# Patient Record
Sex: MALE | Race: WHITE | NOT HISPANIC OR LATINO | Employment: OTHER | URBAN - METROPOLITAN AREA
[De-identification: names, ages, dates, MRNs, and addresses within clinical notes are randomized per-mention and may not be internally consistent; named-entity substitution may affect disease eponyms.]

---

## 2021-04-08 DIAGNOSIS — Z23 ENCOUNTER FOR IMMUNIZATION: ICD-10-CM

## 2024-01-22 ENCOUNTER — APPOINTMENT (OUTPATIENT)
Dept: RADIOLOGY | Facility: CLINIC | Age: 64
End: 2024-01-22
Payer: COMMERCIAL

## 2024-01-22 DIAGNOSIS — R05.9 COUGH, UNSPECIFIED TYPE: ICD-10-CM

## 2024-01-22 DIAGNOSIS — R50.9 FEVER, UNSPECIFIED FEVER CAUSE: ICD-10-CM

## 2024-01-22 DIAGNOSIS — R07.9 CHEST PAIN, UNSPECIFIED TYPE: ICD-10-CM

## 2024-01-22 PROCEDURE — 71046 X-RAY EXAM CHEST 2 VIEWS: CPT

## 2025-01-01 ENCOUNTER — HOSPICE ADMISSION (OUTPATIENT)
Dept: HOME HOSPICE | Facility: HOSPICE | Age: 65
End: 2025-01-01
Payer: MEDICARE

## 2025-01-01 PROCEDURE — 10330057 MEDICATION, GENERAL

## 2025-01-01 PROCEDURE — 10330064 BRIEF, TAB CLSR ULTRA MED 32-44 (16/BG 6

## 2025-01-01 PROCEDURE — 10330064 GLOVE, EXAM VNYL MED N/S (100/BX 10BX/CS

## 2025-01-01 PROCEDURE — 10330064 UNDERPAD, REUSE CTN FACE IND PK 34X36"

## 2025-01-01 PROCEDURE — 10330087 HSPC SERVICE INTENSITY ADD-ON

## 2025-01-01 PROCEDURE — 10330064 DRAINING KIT, BOTTLE ASEPT 1000ML (10/CS

## 2025-01-08 ENCOUNTER — TELEPHONE (OUTPATIENT)
Age: 65
End: 2025-01-08

## 2025-01-08 NOTE — TELEPHONE ENCOUNTER
Incoming call by Shayan looking to schedule a consult with goal of obtaining a thoracentesis as soon as possible.   Routing to Dr. Higuera to confirm that this may be able to be done at [facility].

## 2025-01-09 NOTE — TELEPHONE ENCOUNTER
Thank you for the message.  I called the patient and check in with him to see if this needs to be done urgent.  He is okay with waiting until the appointment on the 16th.

## 2025-01-13 ENCOUNTER — OFFICE VISIT (OUTPATIENT)
Dept: PULMONOLOGY | Facility: CLINIC | Age: 65
End: 2025-01-13
Payer: COMMERCIAL

## 2025-01-13 ENCOUNTER — TELEPHONE (OUTPATIENT)
Age: 65
End: 2025-01-13

## 2025-01-13 VITALS
SYSTOLIC BLOOD PRESSURE: 120 MMHG | TEMPERATURE: 98 F | WEIGHT: 185 LBS | HEART RATE: 120 BPM | DIASTOLIC BLOOD PRESSURE: 70 MMHG | OXYGEN SATURATION: 95 %

## 2025-01-13 DIAGNOSIS — J90 BILATERAL PLEURAL EFFUSION: Primary | ICD-10-CM

## 2025-01-13 PROCEDURE — G2211 COMPLEX E/M VISIT ADD ON: HCPCS | Performed by: STUDENT IN AN ORGANIZED HEALTH CARE EDUCATION/TRAINING PROGRAM

## 2025-01-13 PROCEDURE — 99204 OFFICE O/P NEW MOD 45 MIN: CPT | Performed by: STUDENT IN AN ORGANIZED HEALTH CARE EDUCATION/TRAINING PROGRAM

## 2025-01-13 RX ORDER — FINASTERIDE 1 MG/1
1 TABLET, FILM COATED ORAL 3 TIMES WEEKLY
COMMUNITY

## 2025-01-13 RX ORDER — OXYCODONE HYDROCHLORIDE 5 MG/1
5 TABLET ORAL
COMMUNITY
Start: 2024-12-05

## 2025-01-13 RX ORDER — DEXTROMETHORPHAN HYDROBROMIDE AND PROMETHAZINE HYDROCHLORIDE 15; 6.25 MG/5ML; MG/5ML
SYRUP ORAL
COMMUNITY

## 2025-01-13 RX ORDER — SILDENAFIL 100 MG/1
1 TABLET, FILM COATED ORAL DAILY PRN
COMMUNITY

## 2025-01-13 RX ORDER — METOPROLOL SUCCINATE 50 MG/1
50 TABLET, EXTENDED RELEASE ORAL EVERY MORNING
COMMUNITY

## 2025-01-13 RX ORDER — OLANZAPINE 5 MG/1
5 TABLET ORAL EVERY MORNING
COMMUNITY

## 2025-01-13 RX ORDER — LEVOTHYROXINE SODIUM 100 UG/1
1 TABLET ORAL
COMMUNITY
Start: 2024-09-26

## 2025-01-13 RX ORDER — ONDANSETRON 4 MG/1
TABLET, ORALLY DISINTEGRATING ORAL
COMMUNITY
Start: 2024-12-05

## 2025-01-13 RX ORDER — ROSUVASTATIN CALCIUM 20 MG/1
1 TABLET, COATED ORAL
COMMUNITY

## 2025-01-13 RX ORDER — DEXTROMETHORPHAN HBR. AND GUAIFENESIN 10; 100 MG/5ML; MG/5ML
5 SOLUTION ORAL
COMMUNITY
Start: 2025-01-09

## 2025-01-13 RX ORDER — ROSUVASTATIN CALCIUM 20 MG/1
20 TABLET, COATED ORAL DAILY
COMMUNITY
Start: 2024-10-31

## 2025-01-13 RX ORDER — AMLODIPINE BESYLATE 5 MG/1
5 TABLET ORAL DAILY
COMMUNITY
Start: 2024-10-31

## 2025-01-13 RX ORDER — VALACYCLOVIR HYDROCHLORIDE 1 G/1
1 TABLET, FILM COATED ORAL 3 TIMES DAILY
COMMUNITY

## 2025-01-13 NOTE — PROGRESS NOTES
Thoracentesis Procedure Note    Pre-operative Diagnosis: Left malignant pleural effusion    Post-operative Diagnosis: same    Indications: Left pleural effusion    Procedure Details     Consent: Informed consent was obtained. Risks of the procedure were discussed including: infection, bleeding, pain, pneumothorax.    Under sterile conditions the patient was positioned. Betadine solution and sterile drapes were utilized.  1% buffered lidocaine was used to anesthetize the left seventh rib space. Fluid was obtained without any difficulties and minimal blood loss.  A dressing was applied to the wound and wound care instructions were provided.  This required 3 attempts to enter the pleural space    Findings  850 ml of yellow clear pleural fluid was obtained. A sample was sent to Pathology for cytogenetics, flow, and cell counts, as well as for infection analysis.    Complications:  None; patient tolerated the procedure well.          Condition: stable    Plan  Follow-up in 1 month  At that time we can decide if indwelling pleural cath is indicated  Tylenol 650 mg. for pain.    Attending Attestation: I was present for the entire procedure.    Anders Higuera MD  Pulmonary and Critical Care Medicine

## 2025-01-13 NOTE — PROGRESS NOTES
Pulmonary Outpatient Consultation Note   Shayan Prabhakar 64 y.o. male MRN: [id number]  1/13/2025    Referring provider: No referring provider defined for this encounter.     Reason for Consultation: Pleural effusion management    Assessment:    Bilateral pleural effusion likely in setting of stage IV tonsillar cancer along with prostate cancer.  Had a left thoracentesis on December 2 with negative cytology but now complaining of worsening shortness of breath.  Bilateral pleural effusions noted on ultrasound today.  Patient agreeable to bilateral thoracentesis while on Eliquis  Small pericardial effusion, no tamponade physiology in office.  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  He will think about this and follow-up with me in 1 month  Patient considering hospice as he has been through extensive treatment and feels like disease is progressing.  Follow-up in 1 month with me  I have spent a total time of 42 minutes in caring for this patient on the day of the visit/encounter including Diagnostic results, Prognosis, Risks and benefits of tx options, Instructions for management, Patient and family education, Importance of tx compliance, Risk factor reductions, Impressions, Counseling / Coordination of care, Documenting in the medical record, Reviewing / ordering tests, medicine, procedures  , and Obtaining or reviewing history   .    History of Present Illness   HPI:      64-year-old gentleman referred to pulmonary due to pleural effusion management.  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  Along with a history of prostate cancer in 2016, tonsillectomy, left radical neck dissection along with chemoradiation, pulmonary embolism on Eliquis.     Regarding his left tonsil squamous cell this was diagnosed in 2017 as invasive squamous cell carcinoma completed chemotherapy and radiation, in 2012 was found to have enlarging pulmonary nodules suspicious for metastasis underwent VATS left upper lobe nodule wedge resection which was poorly differentiated squamous cell carcinoma involving the lung and the visceral pleura this was known as metastasis from the head and neck, treated with SBRT in 2013    Recently had a left thoracentesis that [facility], cytology was negative.    Patient lives in the [facility] and usually goes to [facility] for treatment but now is leaning towards hospice.  He did have a thoracentesis on December 2 and had 850 mL removed from the left lung and felt mildly improved at the time.  He was initially scheduled to see me later in the week but due to worsening shortness of breath over the weekend he decided to come in earlier.  He is afraid that he is having enlarging pleural effusions and is requesting bilateral thoracentesis.    In the office we performed bilateral pleural ultrasound and found adequate fluid on both sides along with and he was agreeable to proceed with bilateral thoracentesis.  He also requested I do a POCUS of the heart as he was told that he may have a small pericardial effusion, I see very scant amount of fluid in the  upright position around the right ventricle in the parasternal long axis.  No fluid in the four-chamber or subcostal view.    Today we performed bilateral thoracentesis, right side with 1000 mL removed, left side with 850 mL removed.  This was not tested as this is presumed to be malignant.  I did discuss with him if he wants a indwelling pleural catheter this is reasonable.  As a last time he was drained was about 6 weeks ago I do not think we need to rush into placing a catheter.  But this is definitely an option going forward      Review of Systems   Constitutional:  Positive for activity change, appetite change and fatigue.   HENT: Negative.     Eyes: Negative.    Respiratory:  Positive for shortness of breath.    Cardiovascular: Negative.    Gastrointestinal: Negative.    Endocrine: Negative.    Genitourinary: Negative.    Musculoskeletal: Negative.    Skin: Negative.    Allergic/Immunologic: Positive for immunocompromised state.   Neurological: Negative.    Hematological:  Bruises/bleeds easily.   Psychiatric/Behavioral: Negative.           Historical Information   History reviewed. No pertinent past medical history.  Tonsillar cancer   History reviewed. No pertinent surgical history.  VATS  History reviewed. No pertinent family history.  Denies    Occupational History: NA    Social History     Tobacco Use   Smoking Status Never   Smokeless Tobacco Not on file       Meds/Allergies     Current Outpatient Medications:   •  amLODIPine (NORVASC) 5 mg tablet, Take 5 mg by mouth daily, Disp: , Rfl:   •  apixaban (ELIQUIS) 5 mg, Take 5 mg by mouth 2 (two) times a day, Disp: , Rfl:   •  dextromethorphan-guaiFENesin (ROBITUSSIN-DM)  mg/5 mL oral liquid, Take 5 mL by mouth, Disp: , Rfl:   •  levothyroxine 100 mcg tablet, Take 1 tablet by mouth daily before breakfast, Disp: , Rfl:   •  ondansetron (ZOFRAN-ODT) 4 mg disintegrating tablet, dissolve 1 tablet ON TONGUE every 8 hours if needed for nausea OR vomiting,  "Disp: , Rfl:   •  oxyCODONE (ROXICODONE) 5 immediate release tablet, Take 5 mg by mouth, Disp: , Rfl:   •  rosuvastatin (CRESTOR) 20 MG tablet, Take 20 mg by mouth daily, Disp: , Rfl:   •  finasteride (Propecia) 1 MG tablet, Take 1 mg by mouth 3 (three) times a week, Disp: , Rfl:   •  metoprolol succinate (TOPROL-XL) 50 mg 24 hr tablet, Take 50 mg by mouth every morning, Disp: , Rfl:   •  OLANZapine (ZyPREXA) 5 mg tablet, Take 5 mg by mouth every morning, Disp: , Rfl:   •  omeprazole (PriLOSEC) 20 mg delayed release capsule, Take 40 mg by mouth daily, Disp: , Rfl:   •  promethazine-dextromethorphan (PHENERGAN-DM) 6.25-15 mg/5 mL oral syrup, take 5 MILLILITERS by mouth four times a day if needed for cough, Disp: , Rfl:   •  rosuvastatin (CRESTOR) 20 MG tablet, Take 1 tablet by mouth daily at bedtime, Disp: , Rfl:   •  sildenafil (VIAGRA) 100 mg tablet, Take 1 tablet by mouth daily as needed, Disp: , Rfl:   •  valACYclovir (VALTREX) 1,000 mg tablet, Take 1 g by mouth Three times a day, Disp: , Rfl:   No Known Allergies    Vitals: Blood pressure 120/70, pulse (!) 120, temperature 98 °F (36.7 °C), temperature source Tympanic, weight 83.9 kg (185 lb), SpO2 95%., There is no height or weight on file to calculate BMI. Oxygen Therapy  SpO2: 95 %    Physical Exam:    GEN: alert and oriented x 3, pleasant and cooperative   HEENT:  Normocephalic, atraumatic  NECK: No JVD   HEART: Rate, normal S1 and S2  LUNGS: Diminished bilaterally  ABDOMEN:  Normoactive bowel sounds, soft, no tenderness, no distention  EXTREMITIES: no edema  NEURO: no gross focal findings  SKIN:  Dry, intact, warm to touch    Labs: I have personally reviewed pertinent lab results.  Left cytology negative  No results found for: \"IGE\"    Imaging and other studies: Results Review Statement: No pertinent imaging studies reviewed.    Pulmonary function testing:  NA    Other Studies: Results Review Statement: No pertinent imaging studies reviewed.    Anders Higuera" MD  Pulmonary and Critical Care Medicine

## 2025-01-13 NOTE — TELEPHONE ENCOUNTER
Patient called and asked for sooner appointment with Dr Higuera. Patient worried since Dr Higuera advised a follow up in one month and they are worried they will need another thoracentesis procedure sooner than appointment given 3/4/25 at 9:20 am with Dr Higuera. Patient requested a call back to discuss. Please advise.

## 2025-01-13 NOTE — PATIENT INSTRUCTIONS
It was a pleasure seeing you today!    Follow-up in 1 month with me  Consider bilateral indwelling pleural catheters or as needed thoracentesis    Anders Higuera MD  Pulmonary and Critical Care Medicine

## 2025-01-13 NOTE — PROGRESS NOTES
Thoracentesis Procedure Note    Pre-operative Diagnosis: Right Malignant effusion     Post-operative Diagnosis: same    Indications: Right pleural effusion     Procedure Details     Consent: Informed consent was obtained. Risks of the procedure were discussed including: infection, bleeding, pain, pneumothorax.    Under sterile conditions the patient was positioned. Betadine solution and sterile drapes were utilized.  1% buffered lidocaine was used to anesthetize the right 7th rib space. Fluid was obtained without any difficulties and minimal blood loss.  A dressing was applied to the wound and wound care instructions were provided.     Findings  1000 ml of bloody pleural fluid was obtained. A sample was sent to Pathology for cytogenetics, flow, and cell counts, as well as for infection analysis.    Complications:  None; patient tolerated the procedure well.          Condition: stable    Plan  No CXR  Has pleural sliding  Tylenol 650 mg. for pain.  Follow up in 1 month for evaluation of indwelling pleural catheter     Attending Attestation: I was present for the entire procedure.    Anders Higuera MD  Pulmonary and Critical Care Medicine

## 2025-01-14 ENCOUNTER — NURSE TRIAGE (OUTPATIENT)
Age: 65
End: 2025-01-14

## 2025-01-14 NOTE — TELEPHONE ENCOUNTER
"Incoming call:    Shayan experiencing 102.2 fever today after a bilateral thoracentesis performed yesterday. Unsure if related. Denies any other symptoms apart from fatigue.   Denies excess/purulent drainage or discoloration of dressings.    Routing for provider review and recommendation. No appointments with Dr. Higuera open for today per protocol.     Reason for Disposition   Fever > 101 F (38.3 C) and over 60 years of age    Answer Assessment - Initial Assessment Questions  1. TEMPERATURE: \"What is the most recent temperature?\"  \"How was it measured?\"       102.2 F  2. ONSET: \"When did the fever start?\"       Today  3. CHILLS: \"Do you have chills?\" If yes: \"How bad are they?\"  (e.g., none, mild, moderate, severe)      -  4. OTHER SYMPTOMS: \"Do you have any other symptoms besides the fever?\"  (e.g., abdomen pain, cough, diarrhea, earache, headache, sore throat, urination pain)      fatigue  5. CAUSE: If there are no symptoms, ask: \"What do you think is causing the fever?\"       unsure    Protocols used: Fever-Adult-[place]    "

## 2025-01-15 NOTE — TELEPHONE ENCOUNTER
I called patient, still feels unwell, fever 101, taking tylenol. Some discomfort by his sides which may be coughing related. No muscle aches or sore throat. Would be odd for this to occur from thoracentesis. Could have picked up a viral infection in general.    Advised to try tylenol for another 24-48 hours and if no relief he should come to ER for swabs and imaging.     SK

## 2025-01-27 ENCOUNTER — NURSE TRIAGE (OUTPATIENT)
Age: 65
End: 2025-01-27

## 2025-01-27 NOTE — TELEPHONE ENCOUNTER
"Pt stated Pulm Provider: Dr. Higuera    Actionable item: Advise on SOB/Effusion/CXR    What is the reason for the call/chief complaint?  Pt states he feels like fluid is building up in lungs again, worsening SOB with activity. Denies fever or other S/S.    Priority: HIGH      Reason for Disposition   Nursing judgment    Answer Assessment - Initial Assessment Questions  1. REASON FOR CALL: \"What is the main reason for your call?\" or \"How can I best help you?\"      Pt states he feels like fluid is building up again in lungs, worsening SOB.   2. SYMPTOMS : \"Do you have any symptoms?\"       Worsening SOB with activity like previous time.  3. OTHER QUESTIONS: \"Do you have any other questions?\"      No    Protocols used: Information Only Call - No Triage-Adult-[place]    "

## 2025-01-28 ENCOUNTER — PREP FOR PROCEDURE (OUTPATIENT)
Dept: PULMONOLOGY | Facility: CLINIC | Age: 65
End: 2025-01-28

## 2025-01-28 ENCOUNTER — PROCEDURE VISIT (OUTPATIENT)
Dept: PULMONOLOGY | Facility: CLINIC | Age: 65
End: 2025-01-28
Payer: MEDICARE

## 2025-01-28 ENCOUNTER — RESULTS FOLLOW-UP (OUTPATIENT)
Dept: PULMONOLOGY | Facility: CLINIC | Age: 65
End: 2025-01-28

## 2025-01-28 ENCOUNTER — HOSPITAL ENCOUNTER (OUTPATIENT)
Dept: CT IMAGING | Facility: HOSPITAL | Age: 65
Discharge: HOME | End: 2025-01-28
Attending: STUDENT IN AN ORGANIZED HEALTH CARE EDUCATION/TRAINING PROGRAM
Payer: MEDICARE

## 2025-01-28 VITALS
OXYGEN SATURATION: 99 % | SYSTOLIC BLOOD PRESSURE: 122 MMHG | DIASTOLIC BLOOD PRESSURE: 70 MMHG | TEMPERATURE: 97.8 F | HEART RATE: 98 BPM

## 2025-01-28 DIAGNOSIS — J90 BILATERAL PLEURAL EFFUSION: Primary | ICD-10-CM

## 2025-01-28 DIAGNOSIS — J90 BILATERAL PLEURAL EFFUSION: ICD-10-CM

## 2025-01-28 LAB
APPEARANCE FLD: CLEAR
COLOR FLD: ABNORMAL
LDH FLD L TO P-CCNC: 828 U/L
LYMPHOCYTES NFR BLD AUTO: 48 %
MONOCYTES NFR BLD AUTO: 49 %
NEUTS SEG NFR BLD AUTO: 3 %
PROT FLD-MCNC: 3.4 G/DL
SITE: ABNORMAL
TOTAL CELLS COUNTED SPEC: 100
WBC # FLD MANUAL: 676 /UL

## 2025-01-28 PROCEDURE — 88185 FLOWCYTOMETRY/TC ADD-ON: CPT | Performed by: STUDENT IN AN ORGANIZED HEALTH CARE EDUCATION/TRAINING PROGRAM

## 2025-01-28 PROCEDURE — 71250 CT THORAX DX C-: CPT

## 2025-01-28 PROCEDURE — 99214 OFFICE O/P EST MOD 30 MIN: CPT | Performed by: STUDENT IN AN ORGANIZED HEALTH CARE EDUCATION/TRAINING PROGRAM

## 2025-01-28 PROCEDURE — 87070 CULTURE OTHR SPECIMN AEROBIC: CPT | Performed by: STUDENT IN AN ORGANIZED HEALTH CARE EDUCATION/TRAINING PROGRAM

## 2025-01-28 PROCEDURE — 32555 ASPIRATE PLEURA W/ IMAGING: CPT | Performed by: STUDENT IN AN ORGANIZED HEALTH CARE EDUCATION/TRAINING PROGRAM

## 2025-01-28 PROCEDURE — 88112 CYTOPATH CELL ENHANCE TECH: CPT | Performed by: STUDENT IN AN ORGANIZED HEALTH CARE EDUCATION/TRAINING PROGRAM

## 2025-01-28 PROCEDURE — 87205 SMEAR GRAM STAIN: CPT | Performed by: STUDENT IN AN ORGANIZED HEALTH CARE EDUCATION/TRAINING PROGRAM

## 2025-01-28 PROCEDURE — 88305 TISSUE EXAM BY PATHOLOGIST: CPT | Performed by: STUDENT IN AN ORGANIZED HEALTH CARE EDUCATION/TRAINING PROGRAM

## 2025-01-28 PROCEDURE — 89051 BODY FLUID CELL COUNT: CPT | Performed by: STUDENT IN AN ORGANIZED HEALTH CARE EDUCATION/TRAINING PROGRAM

## 2025-01-28 PROCEDURE — 84157 ASSAY OF PROTEIN OTHER: CPT | Performed by: STUDENT IN AN ORGANIZED HEALTH CARE EDUCATION/TRAINING PROGRAM

## 2025-01-28 PROCEDURE — 88184 FLOWCYTOMETRY/ TC 1 MARKER: CPT | Performed by: STUDENT IN AN ORGANIZED HEALTH CARE EDUCATION/TRAINING PROGRAM

## 2025-01-28 PROCEDURE — 83615 LACTATE (LD) (LDH) ENZYME: CPT | Performed by: STUDENT IN AN ORGANIZED HEALTH CARE EDUCATION/TRAINING PROGRAM

## 2025-01-28 RX ORDER — PROMETHAZINE HYDROCHLORIDE AND CODEINE PHOSPHATE 6.25; 1 MG/5ML; MG/5ML
5 SYRUP ORAL EVERY 4 HOURS PRN
Qty: 118 ML | Refills: 0 | Status: SHIPPED | OUTPATIENT
Start: 2025-01-28 | End: 2025-02-06

## 2025-01-28 NOTE — PATIENT INSTRUCTIONS
It was a pleasure seeing you today!    Right side fluid for testing  Obtain CT chest today  We will reach out to you for right-sided indwelling pleural catheter    Anders Higuera MD  Pulmonary and Critical Care Medicine

## 2025-01-28 NOTE — PROGRESS NOTES
Pulmonary Outpatient Progress Note   Shayan Prabhakar 64 y.o. male MRN: [id number]  1/28/2025    Assessment:    Bilateral pleural effusion likely in setting of stage IV tonsillar cancer along with prostate cancer.  Had a left thoracentesis on December 2 with negative cytology then underwent bilateral thoracentesis on January 13.  Now complaining of worsening shortness of breath therefore here for evaluation.  Right side was drained again today.  Left side unable to be drained, fluid likely collagenous  Chronic cough this may be from pleural effusion but also from malignancy.  He has several different cough medication agents at home.  I will prescribe Phenergan with codeine to assist with this.  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  Patient is most likely not undergoing any additional treatment of malignancy at this time.  He does complain of anxiety and pain and a feeling of breathlessness.  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    Left-sided unable to drain  Patient agreeable to proceed with indwelling pleural catheter, we will make arrangements for me to perform procedure in the next 2 weeks  Obtain CT chest without contrast today to evaluate pleural space   Phenergan with codeine. Paper prescription given to patient   Refer to palliative care as patient is agreeable to this.  I have spent a total time of 32 minutes in caring for this patient on the day of the visit/encounter including Diagnostic results, Prognosis, Risks and benefits of tx  options, Instructions for management, Patient and family education, Importance of tx compliance, Risk factor reductions, Impressions, Counseling / Coordination of care, Documenting in the medical record, Reviewing / ordering tests, medicine, procedures  , and Obtaining or reviewing history  .    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  Along with a history of prostate cancer in 2016, tonsillectomy, left radical neck dissection along with chemoradiation, pulmonary embolism on Eliquis.     Regarding his left tonsil squamous cell this was diagnosed in 2017 as invasive squamous cell carcinoma completed chemotherapy and radiation, in 2012 was found to have enlarging pulmonary nodules suspicious for metastasis underwent VATS left upper lobe nodule wedge resection which was poorly differentiated squamous cell carcinoma involving the lung and the visceral pleura this was known as metastasis from the head and neck, treated with SBRT in 2013    Recently had a left thoracentesis that [facility], cytology was negative.    Patient lives in the [facility] and usually goes to [facility] for treatment but now is leaning towards hospice.  He did have a thoracentesis on December 2 and had 850 mL removed from the left lung and felt mildly improved at the time.      He then came to our office for urgent visit and had bilateral thoracentesis and had significant improvement afterwards.  About 1 week after he started feeling shortness of breath and had reaccumulation of fluid.  He now states that he struggling to breathe and therefore came in for urgent evaluation again.    We performed a  right-sided thoracentesis which was uncomplicated.  Sending for testing.  We tried a left thoracentesis and poked in 3 different spots but this area was likely collagenous and there was no fluid to be aspirated.  The catheter went in at least 5 cm and still no fluid to be aspirated.  There was no fluid return on lidocaine needle either.    We discussed indwelling pleural catheter on the right side and he is agreeable.  If there is any improvement in the left side we could consider indwelling pleural catheter as well.  He complained of significant chronic cough and now is asking for cough medication that stronger than what he has already therefore I will prescribe Phenergan with codeine.  I also explained to him that he might benefit from seeing palliative care, he is agreeable, referral placed.    Review of Systems   Constitutional:  Positive for activity change, appetite change and fatigue.   HENT: Negative.     Eyes: Negative.    Respiratory:  Positive for shortness of breath.    Cardiovascular: Negative.    Gastrointestinal: Negative.    Endocrine: Negative.    Genitourinary: Negative.    Musculoskeletal: Negative.    Skin: Negative.    Allergic/Immunologic: Positive for immunocompromised state.   Neurological: Negative.    Hematological:  Bruises/bleeds easily.   Psychiatric/Behavioral: Negative.           Historical Information   No past medical history on file.  Tonsillar cancer   No past surgical history on file.  VATS  No family history on file.  Denies    Occupational History: NA    Social History     Tobacco Use   Smoking Status Never   Smokeless Tobacco Not on file       Meds/Allergies     Current Outpatient Medications:     promethazine-codeine (PHENERGAN WITH CODEINE) 6.25-10 mg/5 mL syrup, Take 5 mL by mouth every 4 (four) hours as needed for cough, Disp: 118 mL, Rfl: 0    amLODIPine (NORVASC) 5 mg tablet, Take 5 mg by mouth daily, Disp: , Rfl:     apixaban (ELIQUIS) 5 mg, Take 5 mg by mouth 2 (two)  times a day, Disp: , Rfl:     dextromethorphan-guaiFENesin (ROBITUSSIN-DM)  mg/5 mL oral liquid, Take 5 mL by mouth, Disp: , Rfl:     finasteride (Propecia) 1 MG tablet, Take 1 mg by mouth 3 (three) times a week, Disp: , Rfl:     levothyroxine 100 mcg tablet, Take 1 tablet by mouth daily before breakfast, Disp: , Rfl:     metoprolol succinate (TOPROL-XL) 50 mg 24 hr tablet, Take 50 mg by mouth every morning, Disp: , Rfl:     OLANZapine (ZyPREXA) 5 mg tablet, Take 5 mg by mouth every morning, Disp: , Rfl:     omeprazole (PriLOSEC) 20 mg delayed release capsule, Take 40 mg by mouth daily, Disp: , Rfl:     ondansetron (ZOFRAN-ODT) 4 mg disintegrating tablet, dissolve 1 tablet ON TONGUE every 8 hours if needed for nausea OR vomiting, Disp: , Rfl:     oxyCODONE (ROXICODONE) 5 immediate release tablet, Take 5 mg by mouth, Disp: , Rfl:     promethazine-dextromethorphan (PHENERGAN-DM) 6.25-15 mg/5 mL oral syrup, take 5 MILLILITERS by mouth four times a day if needed for cough, Disp: , Rfl:     rosuvastatin (CRESTOR) 20 MG tablet, Take 1 tablet by mouth daily at bedtime, Disp: , Rfl:     rosuvastatin (CRESTOR) 20 MG tablet, Take 20 mg by mouth daily, Disp: , Rfl:     sildenafil (VIAGRA) 100 mg tablet, Take 1 tablet by mouth daily as needed, Disp: , Rfl:     valACYclovir (VALTREX) 1,000 mg tablet, Take 1 g by mouth Three times a day, Disp: , Rfl:   No Known Allergies    Vitals: Blood pressure 122/70, pulse 98, temperature 97.8 °F (36.6 °C), temperature source Temporal, SpO2 99%., There is no height or weight on file to calculate BMI. Oxygen Therapy  SpO2: 99 % (5l)    Physical Exam:    GEN: alert and oriented x 3, pleasant and cooperative   HEENT:  Normocephalic, atraumatic  NECK: No JVD   HEART: Rate, normal S1 and S2  LUNGS: Diminished bilaterally  ABDOMEN:  Normoactive bowel sounds, soft, no tenderness, no distention  EXTREMITIES: no edema  NEURO: no gross focal findings  SKIN:  Dry, intact, warm to touch    Labs: I  "have personally reviewed pertinent lab results.  Left cytology negative  No results found for: \"IGE\"    Imaging and other studies: Results Review Statement: No pertinent imaging studies reviewed.    Pulmonary function testing:  NA    Other Studies: Results Review Statement: No pertinent imaging studies reviewed.    Anders Higuera MD  Pulmonary and Critical Care Medicine     "

## 2025-01-28 NOTE — PROGRESS NOTES
Thoracentesis Procedure Note    Pre-operative Diagnosis: R pleural effusion    Post-operative Diagnosis: TBD    Indications: R pleural effusion, progressive SOB    Procedure Details     Consent: Informed consent was obtained. Risks of the procedure were discussed including: infection, bleeding, pain, pneumothorax.    Under sterile conditions the patient was positioned. Betadine solution and sterile drapes were utilized.  1% plain lidocaine was used to anesthetize the 5th rib space. Fluid was obtained without any difficulties and minimal blood loss.  A dressing was applied to the wound and wound care instructions were provided.     Findings  1500 ml of bloody pleural fluid was obtained. A sample was sent to Pathology for cytogenetics, flow, and cell counts, as well as for infection analysis.    Complications:  None; patient tolerated the procedure well.          Condition: stable    Plan  Tylenol 650 mg. for pain.    Robbin Elmore D.O.  PGY-5, Pulmonary/Critical Care  [facility]

## 2025-01-28 NOTE — PROGRESS NOTES
Thoracentesis Procedure Note    Pre-operative Diagnosis: L pleural effusion    Post-operative Diagnosis: TBD    Indications: L Pleural effusion    Procedure Details     Consent: Informed consent was obtained. Risks of the procedure were discussed including: infection, bleeding, pain, pneumothorax.    Under sterile conditions the patient was positioned. Betadine solution and sterile drapes were utilized.  1% plain lidocaine was used to anesthetize the 5th and 6th rib space. Fluid was obtained without any difficulties and minimal blood loss.  A dressing was applied to the wound and wound care instructions were provided.     Findings  3 attempts were made to access the pleural space, which were successful; however, fluid was unable to be aspirated. Confirmation with US.    Complications:  None; patient tolerated the procedure well.          Condition: stable    Plan  A follow up CT chest without contrast.  Tylenol 650 mg. for pain.      Robbin Elmore D.O.  PGY-5, Pulmonary/Critical Care  [facility]

## 2025-01-29 ENCOUNTER — PATIENT OUTREACH (OUTPATIENT)
Dept: CASE MANAGEMENT | Facility: HOSPITAL | Age: 65
End: 2025-01-29

## 2025-01-29 ENCOUNTER — HOME CARE VISIT (OUTPATIENT)
Dept: HOME HEALTH SERVICES | Facility: HOME HEALTHCARE | Age: 65
End: 2025-01-29
Payer: MEDICARE

## 2025-01-29 NOTE — TELEPHONE ENCOUNTER
Pulmonary    Notified by patient that he would like to proceed with hospice. I will place a hospice referral and a oncology social work referral    SK

## 2025-01-29 NOTE — PROGRESS NOTES
"OncSW referral received, chart review completed.  Pt has requested home hospice care at this time, referral was sent to  Hospice but initially denied because pt had mentioned wanting [facility].  Call out to pt this morning, he states that he is doing \"fair\" and wants hospice to come out to the house.  I asked if [facility] was his preference and he said that he would prefer to stay with [facility] if possible, he just was not sure that we had hospice care available.  Secure chat sent to hospice LPN Marjorie who confirms that they are able to admit and will reach out to him to schedule.  No other needs noted at this time, referral will be closed.  "

## 2025-01-30 ENCOUNTER — TELEPHONE (OUTPATIENT)
Age: 65
End: 2025-01-30

## 2025-01-30 LAB — SCAN RESULT: NORMAL

## 2025-01-30 PROCEDURE — 88305 TISSUE EXAM BY PATHOLOGIST: CPT | Performed by: STUDENT IN AN ORGANIZED HEALTH CARE EDUCATION/TRAINING PROGRAM

## 2025-01-30 PROCEDURE — 88112 CYTOPATH CELL ENHANCE TECH: CPT | Performed by: STUDENT IN AN ORGANIZED HEALTH CARE EDUCATION/TRAINING PROGRAM

## 2025-01-30 NOTE — TELEPHONE ENCOUNTER
Called and spoke with Abi regarding patients procedure. We have moved him up to 02/04 at 1pm. Patient aware of procedure date and location. He will receive a call the day before with exact time of arrival.     Thank You

## 2025-01-30 NOTE — TELEPHONE ENCOUNTER
The patient called wanting to move their procedure up during the week can we please advise the patient thank you

## 2025-01-30 NOTE — TELEPHONE ENCOUNTER
Abi called in regards to patient's procedure coming up on 2/7/25.  She asked to speak to Yasmeen.  I told the Abi that Yasmeen would call her back after trying to reach her via Teams.  Abi said she could be reached at [phone] or the patient at the number on his chart.  Thank you.

## 2025-01-31 LAB
BACTERIA SPEC BFLD CULT: NO GROWTH
GRAM STN SPEC: NORMAL
GRAM STN SPEC: NORMAL

## 2025-02-04 ENCOUNTER — HOSPITAL ENCOUNTER (OUTPATIENT)
Dept: GASTROENTEROLOGY | Facility: HOSPITAL | Age: 65
Discharge: HOME | End: 2025-02-04
Attending: STUDENT IN AN ORGANIZED HEALTH CARE EDUCATION/TRAINING PROGRAM
Payer: MEDICARE

## 2025-02-04 ENCOUNTER — HOSPITAL ENCOUNTER (OUTPATIENT)
Dept: RADIOLOGY | Facility: HOSPITAL | Age: 65
Discharge: HOME | End: 2025-02-04
Payer: MEDICARE

## 2025-02-04 ENCOUNTER — PREP FOR PROCEDURE (OUTPATIENT)
Dept: PULMONOLOGY | Facility: CLINIC | Age: 65
End: 2025-02-04

## 2025-02-04 VITALS
SYSTOLIC BLOOD PRESSURE: 135 MMHG | TEMPERATURE: 99.4 F | RESPIRATION RATE: 22 BRPM | OXYGEN SATURATION: 97 % | DIASTOLIC BLOOD PRESSURE: 92 MMHG | HEART RATE: 127 BPM

## 2025-02-04 DIAGNOSIS — J90 BILATERAL PLEURAL EFFUSION: Primary | ICD-10-CM

## 2025-02-04 DIAGNOSIS — C09.9 SQUAMOUS CELL CARCINOMA OF LEFT TONSIL (HCC): Primary | ICD-10-CM

## 2025-02-04 DIAGNOSIS — J90 BILATERAL PLEURAL EFFUSION: ICD-10-CM

## 2025-02-04 LAB
LYMPHOCYTES NFR BLD AUTO: 72 %
MONOCYTES NFR BLD AUTO: 19 %
NEUTS SEG NFR BLD AUTO: 9 %
SITE: NORMAL
TOTAL CELLS COUNTED SPEC: 100
WBC # FLD MANUAL: 489 /UL

## 2025-02-04 PROCEDURE — 32554 ASPIRATE PLEURA W/O IMAGING: CPT | Performed by: STUDENT IN AN ORGANIZED HEALTH CARE EDUCATION/TRAINING PROGRAM

## 2025-02-04 PROCEDURE — 89051 BODY FLUID CELL COUNT: CPT | Performed by: STUDENT IN AN ORGANIZED HEALTH CARE EDUCATION/TRAINING PROGRAM

## 2025-02-04 PROCEDURE — NC001 PR NO CHARGE: Performed by: STUDENT IN AN ORGANIZED HEALTH CARE EDUCATION/TRAINING PROGRAM

## 2025-02-04 PROCEDURE — 87205 SMEAR GRAM STAIN: CPT | Performed by: STUDENT IN AN ORGANIZED HEALTH CARE EDUCATION/TRAINING PROGRAM

## 2025-02-04 PROCEDURE — 71045 X-RAY EXAM CHEST 1 VIEW: CPT

## 2025-02-04 PROCEDURE — 88305 TISSUE EXAM BY PATHOLOGIST: CPT | Performed by: PATHOLOGY

## 2025-02-04 PROCEDURE — 87070 CULTURE OTHR SPECIMN AEROBIC: CPT | Performed by: STUDENT IN AN ORGANIZED HEALTH CARE EDUCATION/TRAINING PROGRAM

## 2025-02-04 PROCEDURE — 88112 CYTOPATH CELL ENHANCE TECH: CPT | Performed by: PATHOLOGY

## 2025-02-04 RX ORDER — GABAPENTIN 100 MG/1
100 CAPSULE ORAL
Qty: 30 CAPSULE | Refills: 0 | Status: SHIPPED | OUTPATIENT
Start: 2025-02-04 | End: 2025-02-06

## 2025-02-04 RX ORDER — ACETAMINOPHEN 500 MG
1000 TABLET ORAL EVERY 6 HOURS PRN
Qty: 30 TABLET | Refills: 0 | Status: SHIPPED | OUTPATIENT
Start: 2025-02-04 | End: 2025-02-06

## 2025-02-04 NOTE — PROCEDURES
Pleural catheter insertion    Date/Time: 2/4/2025 1:37 PM    Performed by: Lyric Bangura MD  Authorized by: Anders Higuera MD    Patient Location: GI procedure room    Other Assisting Provider: Yes (comment) (Anders Higuera MD; Jose Ochoa MD)      Universal Protocol:  Consent obtained: written  Risks and benefits: Risks, benefits and alternatives were discussed     Consent given by: spouse  Patient states understanding of procedure being performed: yes    Patient's understanding of procedure matches consent: yes    Patient's consent matches procedures scheduled: yes    Relevant documents present and verified: yes    Test results available and properly labeled: yes    Site/side marked: yes    Imaging studies available: yes    Ultrasound guidance: yes    Ultrasound image availability: not saved  Sterile ultrasound techniques: sterile gel and probe covers were used in ultrasound-guided pleural catheter insertion  Patient identity confirmed: arm band and hospital-assigned identification number  Time out: Immediately prior to the procedure a time out was called      Procedure Details:     PRE OPERATIVE DIAGNOSIS:  Recurrent pleural effusion    LOCATION:   Right Hemithorax    Consent: Informed consent was obtained. Risks of the procedure were discussed including, but not limited to: Infection, Bleeding, Pain, Pneumothorax and Injury to surrounding structures. Images reviewed prior to the procedure.  Final Time Out was performed.    Right hemithorax evaluated with bedside ultrasound, fluid pocked identified and appropriate for TPC insertion.    ASA: 4    MALLAMPATI SCORE:  II    Analgesia: Subcutaneous Lidocaine 20 cc. Was other anesthesia used? No.    PROCEDURE:  The patient was placed on supine lateral decubitus position.  Using US guidance, pleural fluid pocket was successfully identified.  Site marked on skin.    Site was prepped and draped in sterile fashion.  SQ tissue infiltrated with 1% lidocaine with epi.  The  finder needle was advanced over rib - 5, posterior axillary line.  Pleural fluid obtained successfully.  Finder needle was removed. The introducer needle was then advanced in similar location to finder and flash of pleural fluid obtained. Wire was easily advanced and introducer needle was removed. A 1cm skin incision at wire entry site.  An additional 1cm incision was made approximately 4cm anterior to the wire insertion site. Using a trochar the catheter was tunneled under the skin from anterior to posterior incision sites.  The cuff was seated approximately 1cm beyond insertion site.  The pleural insertion tract was then serially dilated, and the dilatator/breakaway sheath was inserted.  The catheter was then inserted thru the breakaway sheath, as the sheath was removed.  The catheter was fully seated under the skin.  Using tunneled pleural catheter bottles 1 L was drained.  Catheter was sutured in place - 1 suture(s) at wire insertion site and 1 at catheter insertion site.  Sterile dressing was then placed.    Post procedure Chest X-ray was reviewed and showed Adequate catheter placement, without any apparent complications.    COMPLICATIONS:  None    ESTIMATED BLOOD LOSS:  Minimal    RECOMMENDATIONS:   Drainage is recommended every other day until output is less than 50cc for three consecutive drainage attempts.  Suture removal will be arranged in 7 days.

## 2025-02-04 NOTE — PROCEDURES
Thoracentesis (Bedside)    Date/Time: 2/4/2025 2:41 PM    Performed by: Jose Ochoa MD  Authorized by: Jose Ochoa MD    Patient location:  Bedside  Consent:     Consent obtained:  Written and verbal    Consent given by:  Patient    Risks discussed:  Bleeding, incomplete drainage, nerve damage, pneumothorax, pain and infection    Alternatives discussed:  No treatment, delayed treatment and alternative treatment  Universal protocol:     Relevant documents present and verified: yes      Radiology Images displayed and confirmed.  If images not available, report reviewed: yes      Site/side marked: yes      Immediately prior to procedure a time out was called: yes      Patient identity confirmed:  Verbally with patient and arm band  Indications:     Procedure Purpose: diagnostic and therapeutic      Indications: pleural effusion    Anesthesia (see MAR for exact dosages):     Anesthesia method:  Local infiltration    Local anesthetic:  Lidocaine 1% w/o epi  Procedure details:     Preparation: Patient was prepped and draped in usual sterile fashion      Standard thoracentesis cath kit used: Yes      Patient position:  Supine    Laterality:  Left    Location:  Midaxillary line    Intercostal space:  8th    Number of attempts:  2    Drainage color:  Alyce    Drainage characteristics:  Clear    Fluid removed amount:  600 mL  Post-procedure details:     Chest x-ray performed: yes      Patient tolerance of procedure:  Tolerated well, no immediate complications

## 2025-02-05 ENCOUNTER — HOME CARE VISIT (OUTPATIENT)
Dept: HOME HOSPICE | Facility: HOSPICE | Age: 65
End: 2025-02-05
Payer: MEDICARE

## 2025-02-05 ENCOUNTER — TELEPHONE (OUTPATIENT)
Age: 65
End: 2025-02-05

## 2025-02-05 ENCOUNTER — HOME CARE VISIT (OUTPATIENT)
Dept: HOME HEALTH SERVICES | Facility: HOME HEALTHCARE | Age: 65
End: 2025-02-05
Payer: MEDICARE

## 2025-02-05 DIAGNOSIS — C78.02 METASTATIC SQUAMOUS CELL CARCINOMA TO LUNG, LEFT (HCC): Chronic | ICD-10-CM

## 2025-02-05 DIAGNOSIS — I10 ESSENTIAL HYPERTENSION: ICD-10-CM

## 2025-02-05 DIAGNOSIS — C09.0 MALIGNANT NEOPLASM OF TONSILLAR FOSSA (HCC): ICD-10-CM

## 2025-02-05 DIAGNOSIS — R13.12 DYSPHAGIA, OROPHARYNGEAL PHASE: ICD-10-CM

## 2025-02-05 DIAGNOSIS — Z51.5 HOSPICE CARE PATIENT: Primary | ICD-10-CM

## 2025-02-05 DIAGNOSIS — I48.0 PAROXYSMAL ATRIAL FIBRILLATION (HCC): ICD-10-CM

## 2025-02-05 PROBLEM — F06.8 ANXIETY DISORDER DUE TO MULTIPLE MEDICAL PROBLEMS: Status: ACTIVE | Noted: 2025-01-31

## 2025-02-05 PROBLEM — C34.2 MALIGNANT NEOPLASM OF MIDDLE LOBE OF RIGHT LUNG (HCC): Chronic | Status: ACTIVE | Noted: 2021-08-05

## 2025-02-05 PROBLEM — J91.0 MALIGNANT PLEURAL EFFUSION: Status: ACTIVE | Noted: 2024-01-03

## 2025-02-05 PROBLEM — J44.9 CHRONIC OBSTRUCTIVE PULMONARY DISEASE (HCC): Chronic | Status: ACTIVE | Noted: 2019-01-31

## 2025-02-05 PROBLEM — E03.9 HYPOTHYROIDISM (ACQUIRED): Status: ACTIVE | Noted: 2025-02-05

## 2025-02-05 PROBLEM — I31.39 PERICARDIAL EFFUSION: Status: ACTIVE | Noted: 2024-11-30

## 2025-02-05 PROBLEM — C34.11 MALIGNANT NEOPLASM OF UPPER LOBE, RIGHT BRONCHUS OR LUNG (HCC): Status: ACTIVE | Noted: 2024-01-03

## 2025-02-05 PROBLEM — Z85.818 HISTORY OF CANCER TONSIL: Status: ACTIVE | Noted: 2019-01-31

## 2025-02-05 PROBLEM — C79.00: Status: ACTIVE | Noted: 2022-01-21

## 2025-02-05 PROBLEM — R91.8 LUNG NODULES: Status: ACTIVE | Noted: 2025-02-05

## 2025-02-05 PROBLEM — R64 CANCER CACHEXIA (HCC): Chronic | Status: ACTIVE | Noted: 2025-01-09

## 2025-02-05 PROBLEM — R59.1 LYMPHADENOPATHY: Status: ACTIVE | Noted: 2024-08-26

## 2025-02-05 PROBLEM — C61 MALIGNANT NEOPLASM OF PROSTATE (HCC): Status: ACTIVE | Noted: 2017-01-18

## 2025-02-05 PROBLEM — R00.2 PALPITATIONS: Status: ACTIVE | Noted: 2024-02-21

## 2025-02-05 PROBLEM — A60.02 HERPES GENITALIS IN MEN: Status: ACTIVE | Noted: 2024-01-03

## 2025-02-05 PROBLEM — R09.02 HYPOXIA: Status: ACTIVE | Noted: 2024-11-30

## 2025-02-05 PROCEDURE — G0299 HHS/HOSPICE OF RN EA 15 MIN: HCPCS

## 2025-02-05 RX ORDER — OXYCODONE HYDROCHLORIDE 5 MG/1
TABLET ORAL
Qty: 60 TABLET | Refills: 0 | Status: SHIPPED | OUTPATIENT
Start: 2025-02-05 | End: 2025-02-13

## 2025-02-05 RX ORDER — LORAZEPAM 1 MG/1
TABLET ORAL
Qty: 60 TABLET | Refills: 0 | Status: SHIPPED | OUTPATIENT
Start: 2025-02-05

## 2025-02-05 RX ORDER — METOPROLOL TARTRATE 50 MG
50 TABLET ORAL EVERY 12 HOURS SCHEDULED
Qty: 20 TABLET | Refills: 0 | Status: SHIPPED | OUTPATIENT
Start: 2025-02-05 | End: 2025-02-13

## 2025-02-05 NOTE — TELEPHONE ENCOUNTER
2/5/2025 4:21 PM  [facility] home patient requests emergency fill until [facility] order arrives and SOC medications.  Filled electronically via Epic as per [place] State Law.    Requested Prescriptions     Signed Prescriptions Disp Refills    oxyCODONE (Roxicodone) 5 immediate release tablet 60 tablet 0     Sig: Take 1 tablet (5 mg total) by mouth every 6 (six) hours. May also take 1 tablet (5 mg total) every 2 (two) hours as needed (pain / SOB). Medication may be crushed, dissolved in a drop of water and given under the tongue or diluted in 0.5 - 1mL of water and administered via oral syringe. Max Daily Amount: 80 mg.     Authorizing Provider: GARRETT WALKER    LORazepam (ATIVAN) 1 mg tablet 60 tablet 0     Sig: Take 1 tablet (1 mg total) by mouth every 6 (six) hours. May also take 1 tablet (1 mg total) every 2 (two) hours as needed for anxiety or sleep (SOB / restlessness). Medication may be crushed, dissolved in a drop of water and given under the tongue or diluted in 0.5 - 1mL of water and administered via oral syringe.     Authorizing Provider: GARRETT WALKER    metoprolol tartrate (LOPRESSOR) 50 mg tablet 20 tablet 0     Sig: Take 1 tablet (50 mg total) by mouth every 12 (twelve) hours Medication may be crushed, dissolved in a drop of water and given under the tongue or diluted in 0.5 - 1mL of water and administered via oral syringe     Authorizing Provider: GARRETT WALKER CRNP  [facility]  Hospice Answering Service: [phone]

## 2025-02-05 NOTE — TELEPHONE ENCOUNTER
Esthela from [facility] calling in to discuss referral.   Provided Esthela with Pulmonology Call back # [phone].   Attempt to warm transfer with no answer.

## 2025-02-05 NOTE — TELEPHONE ENCOUNTER
Pt SO Abi is calling to see if the order was sent to [facility] for Hospice and home health services. Informed that Dr. Higuera did send out the referral last night. She states that [facility] never received the referral. Informed him could fax again. Sent the script to the fax number she provided [phone]. She asked if we could notify her when it went through.

## 2025-02-06 ENCOUNTER — HOME CARE VISIT (OUTPATIENT)
Dept: HOME HOSPICE | Facility: HOSPICE | Age: 65
End: 2025-02-06
Payer: MEDICARE

## 2025-02-06 ENCOUNTER — HOME CARE VISIT (OUTPATIENT)
Dept: HOME HEALTH SERVICES | Facility: HOME HEALTHCARE | Age: 65
End: 2025-02-06
Payer: MEDICARE

## 2025-02-06 PROCEDURE — G0299 HHS/HOSPICE OF RN EA 15 MIN: HCPCS

## 2025-02-06 PROCEDURE — G0155 HHCP-SVS OF CSW,EA 15 MIN: HCPCS

## 2025-02-07 ENCOUNTER — HOME CARE VISIT (OUTPATIENT)
Dept: HOME HOSPICE | Facility: HOSPICE | Age: 65
End: 2025-02-07
Payer: MEDICARE

## 2025-02-07 ENCOUNTER — HOME CARE VISIT (OUTPATIENT)
Dept: HOME HEALTH SERVICES | Facility: HOME HEALTHCARE | Age: 65
End: 2025-02-07
Payer: MEDICARE

## 2025-02-07 LAB
BACTERIA SPEC BFLD CULT: NO GROWTH
GRAM STN SPEC: NORMAL
GRAM STN SPEC: NORMAL

## 2025-02-07 PROCEDURE — G0156 HHCP-SVS OF AIDE,EA 15 MIN: HCPCS

## 2025-02-07 PROCEDURE — 88112 CYTOPATH CELL ENHANCE TECH: CPT | Performed by: PATHOLOGY

## 2025-02-07 PROCEDURE — 88305 TISSUE EXAM BY PATHOLOGIST: CPT | Performed by: PATHOLOGY

## 2025-02-07 PROCEDURE — G0299 HHS/HOSPICE OF RN EA 15 MIN: HCPCS

## 2025-02-08 ENCOUNTER — HOME CARE VISIT (OUTPATIENT)
Dept: HOME HOSPICE | Facility: HOSPICE | Age: 65
End: 2025-02-08
Payer: MEDICARE

## 2025-02-08 NOTE — CASE COMMUNICATION
Bella page received at 0858 on Saturday 2/8. SO Abi would like to know ETA of DME delivery. Spoke with Diana from [facility]. ETA between 0930 and 1030. Abi made aware of the same. Appreciative of call and aware to call hospice with any further questions or concerns.

## 2025-02-09 VITALS
TEMPERATURE: 97.1 F | HEART RATE: 137 BPM | SYSTOLIC BLOOD PRESSURE: 98 MMHG | RESPIRATION RATE: 24 BRPM | WEIGHT: 185 LBS | DIASTOLIC BLOOD PRESSURE: 71 MMHG

## 2025-02-09 VITALS
RESPIRATION RATE: 18 BRPM | DIASTOLIC BLOOD PRESSURE: 78 MMHG | HEART RATE: 122 BPM | TEMPERATURE: 97.3 F | SYSTOLIC BLOOD PRESSURE: 123 MMHG

## 2025-02-09 VITALS
SYSTOLIC BLOOD PRESSURE: 123 MMHG | TEMPERATURE: 97.1 F | HEART RATE: 122 BPM | RESPIRATION RATE: 24 BRPM | DIASTOLIC BLOOD PRESSURE: 78 MMHG

## 2025-02-10 ENCOUNTER — HOME CARE VISIT (OUTPATIENT)
Dept: HOME HEALTH SERVICES | Facility: HOME HEALTHCARE | Age: 65
End: 2025-02-10
Payer: MEDICARE

## 2025-02-10 ENCOUNTER — HOME CARE VISIT (OUTPATIENT)
Dept: HOME HOSPICE | Facility: HOSPICE | Age: 65
End: 2025-02-10
Payer: MEDICARE

## 2025-02-10 PROCEDURE — G0156 HHCP-SVS OF AIDE,EA 15 MIN: HCPCS

## 2025-02-11 ENCOUNTER — HOME CARE VISIT (OUTPATIENT)
Dept: HOME HEALTH SERVICES | Facility: HOME HEALTHCARE | Age: 65
End: 2025-02-11
Payer: MEDICARE

## 2025-02-11 VITALS
TEMPERATURE: 98 F | DIASTOLIC BLOOD PRESSURE: 72 MMHG | SYSTOLIC BLOOD PRESSURE: 122 MMHG | HEART RATE: 112 BPM | RESPIRATION RATE: 18 BRPM

## 2025-02-11 PROCEDURE — G0299 HHS/HOSPICE OF RN EA 15 MIN: HCPCS

## 2025-02-11 PROCEDURE — G0155 HHCP-SVS OF CSW,EA 15 MIN: HCPCS

## 2025-02-12 ENCOUNTER — HOME CARE VISIT (OUTPATIENT)
Dept: HOME HEALTH SERVICES | Facility: HOME HEALTHCARE | Age: 65
End: 2025-02-12
Payer: MEDICARE

## 2025-02-12 PROCEDURE — G0156 HHCP-SVS OF AIDE,EA 15 MIN: HCPCS

## 2025-02-13 NOTE — TELEPHONE ENCOUNTER
Just wanted to touch base that patient cancelled his appt with me for tomorrow for suture removal, he does not have another appt with you until the first week of March.   I did see patient asking about possibly visiting nurse removing the sutures -Lyric, is that something that can be done?  If not, Dr. Higuera - do you want to have the office again reach out to him to have him come in sooner for suture removal?

## 2025-02-17 ENCOUNTER — HOME CARE VISIT (OUTPATIENT)
Dept: HOME HEALTH SERVICES | Facility: HOME HEALTHCARE | Age: 65
End: 2025-02-17
Payer: MEDICARE

## 2025-02-17 PROCEDURE — G0156 HHCP-SVS OF AIDE,EA 15 MIN: HCPCS

## 2025-02-18 ENCOUNTER — HOME CARE VISIT (OUTPATIENT)
Dept: HOME HOSPICE | Facility: HOSPICE | Age: 65
End: 2025-02-18
Payer: MEDICARE

## 2025-02-18 ENCOUNTER — HOME CARE VISIT (OUTPATIENT)
Dept: HOME HEALTH SERVICES | Facility: HOME HEALTHCARE | Age: 65
End: 2025-02-18
Payer: MEDICARE

## 2025-02-18 VITALS
SYSTOLIC BLOOD PRESSURE: 110 MMHG | TEMPERATURE: 98.4 F | RESPIRATION RATE: 18 BRPM | DIASTOLIC BLOOD PRESSURE: 78 MMHG | HEART RATE: 100 BPM

## 2025-02-18 PROCEDURE — G0299 HHS/HOSPICE OF RN EA 15 MIN: HCPCS

## 2025-02-20 ENCOUNTER — HOME CARE VISIT (OUTPATIENT)
Dept: HOME HOSPICE | Facility: HOSPICE | Age: 65
End: 2025-02-20
Payer: MEDICARE

## 2025-02-20 ENCOUNTER — HOME CARE VISIT (OUTPATIENT)
Dept: HOME HEALTH SERVICES | Facility: HOME HEALTHCARE | Age: 65
End: 2025-02-20
Payer: MEDICARE

## 2025-02-20 VITALS
TEMPERATURE: 97.8 F | RESPIRATION RATE: 19 BRPM | SYSTOLIC BLOOD PRESSURE: 124 MMHG | HEART RATE: 113 BPM | DIASTOLIC BLOOD PRESSURE: 78 MMHG

## 2025-02-20 PROCEDURE — G0299 HHS/HOSPICE OF RN EA 15 MIN: HCPCS

## 2025-02-21 NOTE — CASE COMMUNICATION
Message received from NELSON Alex requesting cane to be ordered. Message sent to CM via secure chat. Continues to be happy with communication with hospice team and responsiveness to needs.

## 2025-02-24 ENCOUNTER — HOME CARE VISIT (OUTPATIENT)
Dept: HOME HOSPICE | Facility: HOSPICE | Age: 65
End: 2025-02-24
Payer: MEDICARE

## 2025-03-05 ENCOUNTER — HOME CARE VISIT (OUTPATIENT)
Dept: HOME HOSPICE | Facility: HOSPICE | Age: 65
End: 2025-03-05
Payer: MEDICARE

## 2025-04-03 ENCOUNTER — HOME CARE VISIT (OUTPATIENT)
Dept: HOME HOSPICE | Facility: HOSPICE | Age: 65
End: 2025-04-03
Payer: MEDICARE